# Patient Record
Sex: MALE | Race: OTHER | HISPANIC OR LATINO | URBAN - METROPOLITAN AREA
[De-identification: names, ages, dates, MRNs, and addresses within clinical notes are randomized per-mention and may not be internally consistent; named-entity substitution may affect disease eponyms.]

---

## 2024-01-06 ENCOUNTER — EMERGENCY (EMERGENCY)
Facility: HOSPITAL | Age: 49
LOS: 0 days | Discharge: HOME | End: 2024-01-06
Attending: STUDENT IN AN ORGANIZED HEALTH CARE EDUCATION/TRAINING PROGRAM
Payer: OTHER MISCELLANEOUS

## 2024-01-06 VITALS
TEMPERATURE: 98 F | HEART RATE: 81 BPM | WEIGHT: 165.35 LBS | SYSTOLIC BLOOD PRESSURE: 176 MMHG | RESPIRATION RATE: 19 BRPM | OXYGEN SATURATION: 98 % | DIASTOLIC BLOOD PRESSURE: 77 MMHG | HEIGHT: 68 IN
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
## 2024-01-06 DIAGNOSIS — S39.012A STRAIN OF MUSCLE, FASCIA AND TENDON OF LOWER BACK, INITIAL ENCOUNTER: ICD-10-CM
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
## 2024-01-06 DIAGNOSIS — M54.50 LOW BACK PAIN, UNSPECIFIED: ICD-10-CM
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
## 2024-01-06 PROCEDURE — 99284 EMERGENCY DEPT VISIT MOD MDM: CPT

## 2024-01-06 PROCEDURE — 72100 X-RAY EXAM L-S SPINE 2/3 VWS: CPT | Mod: 26

## 2024-01-06 RX ORDER — KETOROLAC TROMETHAMINE 30 MG/ML
60 SYRINGE (ML) INJECTION ONCE
Refills: 0 | Status: DISCONTINUED | OUTPATIENT
Start: 2024-01-06 | End: 2024-01-06

## 2024-01-06 RX ORDER — CYCLOBENZAPRINE HYDROCHLORIDE 10 MG/1
10 TABLET, FILM COATED ORAL ONCE
Refills: 0 | Status: COMPLETED | OUTPATIENT
Start: 2024-01-06 | End: 2024-01-06

## 2024-01-06 RX ORDER — LIDOCAINE 4 G/100G
1 CREAM TOPICAL ONCE
Refills: 0 | Status: COMPLETED | OUTPATIENT
Start: 2024-01-06 | End: 2024-01-06

## 2024-01-06 RX ORDER — TIZANIDINE 4 MG/1
2 TABLET ORAL
Qty: 30 | Refills: 0
Start: 2024-01-06 | End: 2024-01-10

## 2024-01-06 RX ADMIN — Medication 60 MILLIGRAM(S): at 13:52

## 2024-01-06 RX ADMIN — LIDOCAINE 1 PATCH: 4 CREAM TOPICAL at 14:17

## 2024-01-06 RX ADMIN — CYCLOBENZAPRINE HYDROCHLORIDE 10 MILLIGRAM(S): 10 TABLET, FILM COATED ORAL at 13:51

## 2024-01-06 NOTE — ED PROVIDER NOTE - OBJECTIVE STATEMENT
48-year-old male with no significant past medical history presenting to the ED with complaints of left lower back pain without associate bowel bladder incontinence no saddle esthesia after lifting heavy item at work, patient reports pain started since then.  Reports taking Motrin with minimal relief.  Denies any other complaints.  No associated numbness.

## 2024-01-06 NOTE — ED PROVIDER NOTE - CLINICAL SUMMARY MEDICAL DECISION MAKING FREE TEXT BOX
48-year-old male with no significant past medical history presenting to the ED with complaints of left lower back pain without associate bowel bladder incontinence no saddle esthesia after lifting heavy item at work, patient reports pain started since then.  Reports taking Motrin with minimal relief.  Denies any other complaints.  No associated numbness.    lumbar strain   f/u ortho if persistent pain  conservative management   return precautions

## 2024-01-06 NOTE — ED PROVIDER NOTE - CARE PROVIDER_API CALL
Ajit Macias  Orthopaedic Surgery  [address] [facility] [address], [address]  [place] [address]  Phone: [phone]  Fax: [phone]  Follow Up Time: 7-10 Days   Ajit Macias  Orthopaedic Surgery  [address] [facility] [facility] [address], [address]  [place] [address]  Phone: [phone]  Fax: [phone]  Follow Up Time: 7-10 Days

## 2024-01-06 NOTE — ED ADULT NURSE NOTE - NSFALLUNIVINTERV_ED_ALL_ED
Bed/Stretcher in lowest position, wheels locked, appropriate side rails in place/Call bell, personal items and telephone in reach/Instruct patient to call for assistance before getting out of bed/chair/stretcher/Non-slip footwear applied when patient is off stretcher/[place] to call system/Physically safe environment - no spills, clutter or unnecessary equipment/Purposeful proactive rounding/Room/bathroom lighting operational, light cord in reach Bed/Stretcher in lowest position, wheels locked, appropriate side rails in place/Call bell, personal items and telephone in reach/Instruct patient to call for assistance before getting out of bed/chair/stretcher/Non-slip footwear applied when patient is off stretcher/[place] to call system/Physically safe environment - no spills, clutter or unnecessary equipment/Purposeful proactive rounding/Room/bathroom lighting operational, light cord in reach

## 2024-01-06 NOTE — ED PROVIDER NOTE - PROVIDER TOKENS
PROVIDER:[TOKEN:[[id number]],FOLLOWUP:[7-10 Days]] PROVIDER:[TOKEN:[[id number]],FOLLOWUP:[7-10 Days]]

## 2024-01-06 NOTE — ED PROVIDER NOTE - PHYSICAL EXAMINATION
VITAL SIGNS: I have reviewed nursing notes and confirm.  CONSTITUTIONAL: well-appearing, non-toxic, NAD  SKIN: Warm dry, normal skin turgor  HEAD: NCAT  EXT: Full ROM, no bony tenderness, no pedal edema, no calf tenderness  NEURO: normal motor. normal sensory. . Normal gait.  PSYCH: Cooperative, appropriate.

## 2024-01-06 NOTE — ED PROVIDER NOTE - PATIENT PORTAL LINK FT
You can access the FollowMyHealth Patient Portal offered by [facility] by registering at the following website: [email or web address]. By joining [employer]’s FollowMyHealth portal, you will also be able to view your health information using other applications (apps) compatible with our system. You can access the FollowMyHealth Patient Portal offered by [facility] by registering at the following website: [email or web address]. By joining [employer]’s FollowMyHealth portal, you will also be able to view your health information using other applications (apps) compatible with our system.

## 2024-01-08 ENCOUNTER — EMERGENCY (EMERGENCY)
Facility: HOSPITAL | Age: 49
LOS: 0 days | Discharge: HOME | End: 2024-01-08
Attending: EMERGENCY MEDICINE
Payer: COMMERCIAL

## 2024-01-08 VITALS
DIASTOLIC BLOOD PRESSURE: 83 MMHG | TEMPERATURE: 97 F | WEIGHT: 164.91 LBS | RESPIRATION RATE: 17 BRPM | OXYGEN SATURATION: 98 % | SYSTOLIC BLOOD PRESSURE: 144 MMHG | HEIGHT: 68 IN | HEART RATE: 58 BPM

## 2024-01-08 VITALS
RESPIRATION RATE: 18 BRPM | DIASTOLIC BLOOD PRESSURE: 78 MMHG | HEART RATE: 61 BPM | OXYGEN SATURATION: 97 % | TEMPERATURE: 98 F | SYSTOLIC BLOOD PRESSURE: 137 MMHG
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
## 2024-01-08 DIAGNOSIS — I10 ESSENTIAL (PRIMARY) HYPERTENSION: ICD-10-CM

## 2024-01-08 DIAGNOSIS — M54.50 LOW BACK PAIN, UNSPECIFIED: ICD-10-CM
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
## 2024-01-08 DIAGNOSIS — M47.816 SPONDYLOSIS WITHOUT MYELOPATHY OR RADICULOPATHY, LUMBAR REGION: ICD-10-CM

## 2024-01-08 PROBLEM — Z00.00 ENCOUNTER FOR PREVENTIVE HEALTH EXAMINATION: Status: ACTIVE | Noted: 2024-01-08

## 2024-01-08 PROCEDURE — 72131 CT LUMBAR SPINE W/O DYE: CPT | Mod: 26,MA

## 2024-01-08 PROCEDURE — 99284 EMERGENCY DEPT VISIT MOD MDM: CPT

## 2024-01-08 RX ORDER — OXYCODONE AND ACETAMINOPHEN 5; 325 MG/1; MG/1
2 TABLET ORAL
Qty: 32 | Refills: 0
Start: 2024-01-08 | End: 2024-01-11

## 2024-01-08 RX ORDER — IBUPROFEN 200 MG
1 TABLET ORAL
Qty: 20 | Refills: 0
Start: 2024-01-08 | End: 2024-01-12

## 2024-01-08 RX ORDER — IBUPROFEN 200 MG
600 TABLET ORAL ONCE
Refills: 0 | Status: COMPLETED | OUTPATIENT
Start: 2024-01-08 | End: 2024-01-08

## 2024-01-08 RX ADMIN — Medication 600 MILLIGRAM(S): at 04:50

## 2024-01-08 NOTE — ED PROVIDER NOTE - PHYSICAL EXAMINATION
Vitals: HTN at 144/83  Gen: AAOx3, NAD, sitting uncomfortably in stretcher, holding L lower back   Head: ncat, perrla, eomi b/l  Neck: supple, no lymphadenopathy, no midline deviation  Heart: rrr, no m/r/g  Lungs: CTA b/l, no rales/ronchi/wheezes  Abd: soft, nontender, non-distended, no rebound or guarding  Ext: no clubbing/cyanosis/edema  Neuro: sensation and muscle strength intact b/l, steady gait  musculo: + L paraspinal muscle hypertonicity, no midline tenderness or stepoff

## 2024-01-08 NOTE — ED PROVIDER NOTE - CARE PROVIDER_API CALL
Gardenia Quinonez  Orthopaedic Surgery  [address] [facility], [address] [address]  Phone: [phone]  Fax: [phone]  Follow Up Time: Urgent   Gardenia Quinonez  Orthopaedic Surgery  [address] [facility], [address] [address]  Phone: [phone]  Fax: [phone]  Follow Up Time: Urgent

## 2024-01-08 NOTE — ED PROVIDER NOTE - CLINICAL SUMMARY MEDICAL DECISION MAKING FREE TEXT BOX
47 yo M with L lower back pain, concerning for fracture, radiculopathy, doubt renal stone  -CT lumbar, motrin/percocet  -f/u results, reeval

## 2024-01-08 NOTE — ED PROVIDER NOTE - PROVIDER TOKENS
PROVIDER:[TOKEN:[[id number]],FOLLOWUP:[Urgent]] PROVIDER:[TOKEN:[[id number]],FOLLOWUP:[Urgent]]

## 2024-01-08 NOTE — ED ADULT NURSE NOTE - NSFALLRISKFACTORS_ED_ALL_ED
Other (Free Text): \[facility]\"" NER Exc 8/2020 Other (Free Text): [facility] [place] NER Exc 8/2020 No indicators present

## 2024-01-08 NOTE — ED PROVIDER NOTE - CARE PLAN
1 Principal Discharge DX:	Pain in lower back   Principal Discharge DX:	Pain in lower back  Secondary Diagnosis:	Spondylosis of lumbar joint

## 2024-01-08 NOTE — ED PROVIDER NOTE - PROGRESS NOTE DETAILS
Results reported to patient--lumbar spondylosis   Pt. reports feeling better after meds  pt. agrees to f/u with primary care outpt., spinal referral given for urgent f/u   pt. understands to return to ED if symptoms worsen; will d/c with meds for pain, rest encouraged

## 2024-01-08 NOTE — ED PROVIDER NOTE - PATIENT PORTAL LINK FT
You can access the FollowMyHealth Patient Portal offered by [facility] by registering at the following website: [email or web address]. By joining [employer]’s FollowMyHealth portal, you will also be able to view your health information using other applications (apps) compatible with our system. You can access the FollowMyHealth Patient Portal offered by [facility] by registering at the following website: [email or web address]. By joining [employer]’s FollowMyHealth portal, you will also be able to view your health information using other applications (apps) compatible with our system.

## 2024-01-08 NOTE — ED PROVIDER NOTE - OBJECTIVE STATEMENT
49 yo M with L lower back pain after heavy lifting 3 days ago.  Pt. seen yesterday here and given muscle relaxer which helped initially but pain worsened.  No imaging performed thus far.  No history of back pain.    ROS: negative for fever, cough, headache, chest pain, shortness of breath, abd pain, nausea, vomiting, diarrhea, rash, paresthesia, and weakness--all other systems reviewed are negative.   PMH: Denies; Meds: Denies; SH: Denies smoking/drinking/drug use

## 2024-01-09 ENCOUNTER — APPOINTMENT (OUTPATIENT)
Dept: ORTHOPEDIC SURGERY | Facility: CLINIC | Age: 49
End: 2024-01-09
Payer: OTHER MISCELLANEOUS

## 2024-01-09 VITALS — BODY MASS INDEX: 29.82 KG/M2 | HEIGHT: 67 IN | WEIGHT: 190 LBS

## 2024-01-09 DIAGNOSIS — S33.5XXA SPRAIN OF LIGAMENTS OF LUMBAR SPINE, INITIAL ENCOUNTER: ICD-10-CM

## 2024-01-09 PROCEDURE — 99204 OFFICE O/P NEW MOD 45 MIN: CPT

## 2024-01-09 RX ORDER — METHYLPREDNISOLONE 4 MG/1
4 TABLET ORAL
Qty: 1 | Refills: 0 | Status: ACTIVE | COMMUNITY
Start: 2024-01-09 | End: 1900-01-01

## 2024-01-09 RX ORDER — METHOCARBAMOL 750 MG/1
750 TABLET, FILM COATED ORAL
Qty: 30 | Refills: 2 | Status: ACTIVE | COMMUNITY
Start: 2024-01-09 | End: 1900-01-01

## 2024-01-23 ENCOUNTER — APPOINTMENT (OUTPATIENT)
Dept: ORTHOPEDIC SURGERY | Facility: CLINIC | Age: 49
End: 2024-01-23
Payer: OTHER MISCELLANEOUS

## 2024-01-23 VITALS — HEIGHT: 67 IN | BODY MASS INDEX: 29.82 KG/M2 | WEIGHT: 190 LBS

## 2024-01-23 PROCEDURE — 99213 OFFICE O/P EST LOW 20 MIN: CPT

## 2024-01-23 NOTE — PHYSICAL EXAM
[Flexion] : flexion [Bending to left] : bending to left [Bending to right] : bending to right [] : achilles and patella reflexes are symmetrical [FreeTextEntry8] : improving

## 2024-01-23 NOTE — WORK
[Sprain/Strain] : sprain/strain [Was the competent medical cause of the injury] : was the competent medical cause of the injury [Are consistent with the injury] : are consistent with the injury [Consistent with my objective findings] : consistent with my objective findings [Total (100%)] : total (100%) [Reveals pre-existing condition(s) that may affect treatment/prognosis] : reveals pre-existing condition(s) that may affect treatment/prognosis [N/A] : : Not Applicable [Patient] : patient [No] : No [No Rx restrictions] : No Rx restrictions. [I provided the services listed above] :  I provided the services listed above. [FreeTextEntry1] : fair [FreeTextEntry2] : ddd [Medium Work:] : Medium Work: Exerting 20 to 50 pounds of force occasionally, and/or 10 to 25 pounds of force frequently, and/or greater than negligible up to 10 pounds of force constantly to move objects. Physical demand requirements are in excess of those for Light Work [Less than Sedentary Work:] :  Less than Sedentary Work: Unable to meet the requirement of Sedentary Work.

## 2024-01-23 NOTE — HISTORY OF PRESENT ILLNESS
[Lower back] : lower back [Work related] : work related [9] : 9 [5] : 5 [Dull/Aching] : dull/aching [Sharp] : sharp [Constant] : constant [Nothing helps with pain getting better] : Nothing helps with pain getting better [de-identified] : Lifted up a water bucket at work, felt sharp low back pain. Has been to the ER twice due to severe pain, inability to move. No pain down legs. no bowel or bladder changes. No prior low back issues [] : no [FreeTextEntry5] : Pt went to lift a mop bucket when he felt a sharp pain in his lower back. Pt went to ED X-rays and CT were taken and Pt was Dx with a lumbar sprain.

## 2024-02-20 ENCOUNTER — APPOINTMENT (OUTPATIENT)
Dept: ORTHOPEDIC SURGERY | Facility: CLINIC | Age: 49
End: 2024-02-20
Payer: OTHER MISCELLANEOUS

## 2024-02-20 VITALS — BODY MASS INDEX: 29.82 KG/M2 | HEIGHT: 67 IN | WEIGHT: 190 LBS

## 2024-02-20 PROCEDURE — 99213 OFFICE O/P EST LOW 20 MIN: CPT

## 2024-02-20 NOTE — WORK
[Sprain/Strain] : sprain/strain [Was the competent medical cause of the injury] : was the competent medical cause of the injury [Are consistent with the injury] : are consistent with the injury [Consistent with my objective findings] : consistent with my objective findings [Mild Partial] : mild partial [Reveals pre-existing condition(s) that may affect treatment/prognosis] : reveals pre-existing condition(s) that may affect treatment/prognosis [Can return to work without limitations on ______] : can return to work without limitations on [unfilled] [N/A] : : Not Applicable [Patient] : patient [No] : No [No Rx restrictions] : No Rx restrictions. [I provided the services listed above] :  I provided the services listed above. [FreeTextEntry1] : fair [FreeTextEntry2] : ddd [Medium Work:] : Medium Work: Exerting 20 to 50 pounds of force occasionally, and/or 10 to 25 pounds of force frequently, and/or greater than negligible up to 10 pounds of force constantly to move objects. Physical demand requirements are in excess of those for Light Work [Less than Sedentary Work:] :  Less than Sedentary Work: Unable to meet the requirement of Sedentary Work.

## 2024-02-20 NOTE — HISTORY OF PRESENT ILLNESS
[Lower back] : lower back [Work related] : work related [9] : 9 [5] : 5 [Dull/Aching] : dull/aching [Sharp] : sharp [Constant] : constant [Nothing helps with pain getting better] : Nothing helps with pain getting better [de-identified] : Lifted up a water bucket at work, felt sharp low back pain. Has been to the ER twice due to severe pain, inability to move. No pain down legs. no bowel or bladder changes. No prior low back issues [] : no [FreeTextEntry5] : Pt went to lift a mop bucket when he felt a sharp pain in his lower back. Pt went to ED X-rays and CT were taken and Pt was Dx with a lumbar sprain.

## 2024-02-20 NOTE — REASON FOR VISIT
[FreeTextEntry2] : 02/20/2024 Doing better, back to regular work, finished PT 01/23/2024 Doing a bit better, back just a bit stiff

## 2024-04-02 ENCOUNTER — APPOINTMENT (OUTPATIENT)
Dept: MRI IMAGING | Facility: CLINIC | Age: 49
End: 2024-04-02
Payer: OTHER MISCELLANEOUS

## 2024-04-02 ENCOUNTER — APPOINTMENT (OUTPATIENT)
Dept: ORTHOPEDIC SURGERY | Facility: CLINIC | Age: 49
End: 2024-04-02
Payer: OTHER MISCELLANEOUS

## 2024-04-02 VITALS — WEIGHT: 190 LBS | BODY MASS INDEX: 29.82 KG/M2 | HEIGHT: 67 IN

## 2024-04-02 PROCEDURE — 72148 MRI LUMBAR SPINE W/O DYE: CPT

## 2024-04-02 PROCEDURE — 99213 OFFICE O/P EST LOW 20 MIN: CPT

## 2024-04-02 NOTE — HISTORY OF PRESENT ILLNESS
[Lower back] : lower back [Work related] : work related [9] : 9 [Dull/Aching] : dull/aching [5] : 5 [Sharp] : sharp [Constant] : constant [Nothing helps with pain getting better] : Nothing helps with pain getting better [de-identified] : Lifted up a water bucket at work, felt sharp low back pain. Has been to the ER twice due to severe pain, inability to move. No pain down legs. no bowel or bladder changes. No prior low back issues [] : no [FreeTextEntry5] : Pt went to lift a mop bucket when he felt a sharp pain in his lower back. Pt went to ED X-rays and CT were taken and Pt was Dx with a lumbar sprain.

## 2024-04-02 NOTE — REASON FOR VISIT
[FreeTextEntry2] : 04/02/2024 Still with back soreness, doing HEP, using Ibuprofen as needed 02/20/2024 Doing better, back to regular work, finished PT 01/23/2024 Doing a bit better, back just a bit stiff

## 2024-04-09 ENCOUNTER — APPOINTMENT (OUTPATIENT)
Dept: ORTHOPEDIC SURGERY | Facility: CLINIC | Age: 49
End: 2024-04-09
Payer: OTHER MISCELLANEOUS

## 2024-04-09 DIAGNOSIS — S33.9XXD SPRAIN OF UNSPECIFIED PARTS OF LUMBAR SPINE AND PELVIS, SUBSEQUENT ENCOUNTER: ICD-10-CM

## 2024-04-09 DIAGNOSIS — M51.26 OTHER INTERVERTEBRAL DISC DISPLACEMENT, LUMBAR REGION: ICD-10-CM

## 2024-04-09 PROCEDURE — 99213 OFFICE O/P EST LOW 20 MIN: CPT

## 2024-04-09 NOTE — WORK
[Sprain/Strain] : sprain/strain [Was the competent medical cause of the injury] : was the competent medical cause of the injury [Are consistent with the injury] : are consistent with the injury [Consistent with my objective findings] : consistent with my objective findings [Mild Partial] : mild partial [Reveals pre-existing condition(s) that may affect treatment/prognosis] : reveals pre-existing condition(s) that may affect treatment/prognosis [Can return to work without limitations on ______] : can return to work without limitations on [unfilled] [N/A] : : Not Applicable [Patient] : patient [No] : No [No Rx restrictions] : No Rx restrictions. [I provided the services listed above] :  I provided the services listed above. [Medium Work:] : Medium Work: Exerting 20 to 50 pounds of force occasionally, and/or 10 to 25 pounds of force frequently, and/or greater than negligible up to 10 pounds of force constantly to move objects. Physical demand requirements are in excess of those for Light Work [Less than Sedentary Work:] :  Less than Sedentary Work: Unable to meet the requirement of Sedentary Work. [FreeTextEntry1] : fair [FreeTextEntry2] : ddd

## 2024-04-09 NOTE — HISTORY OF PRESENT ILLNESS
[Lower back] : lower back [Work related] : work related [9] : 9 [5] : 5 [Dull/Aching] : dull/aching [Sharp] : sharp [Constant] : constant [Nothing helps with pain getting better] : Nothing helps with pain getting better [de-identified] : Lifted up a water bucket at work, felt sharp low back pain. Has been to the ER twice due to severe pain, inability to move. No pain down legs. no bowel or bladder changes. No prior low back issues [] : no [FreeTextEntry5] : Pt went to lift a mop bucket when he felt a sharp pain in his lower back. Pt went to ED X-rays and CT were taken and Pt was Dx with a lumbar sprain.

## 2024-04-09 NOTE — REASON FOR VISIT
[FreeTextEntry2] : 04/09/2024 Had MRI: HNP left L1/2 impinging on dural sac 04/02/2024 Still with back soreness, doing HEP, using Ibuprofen as needed 02/20/2024 Doing better, back to regular work, finished PT 01/23/2024 Doing a bit better, back just a bit stiff

## 2024-07-09 ENCOUNTER — APPOINTMENT (OUTPATIENT)
Dept: ORTHOPEDIC SURGERY | Facility: CLINIC | Age: 49
End: 2024-07-09
Payer: OTHER MISCELLANEOUS

## 2024-07-09 VITALS — HEIGHT: 67 IN | BODY MASS INDEX: 29.82 KG/M2 | WEIGHT: 190 LBS

## 2024-07-09 DIAGNOSIS — S33.9XXD SPRAIN OF UNSPECIFIED PARTS OF LUMBAR SPINE AND PELVIS, SUBSEQUENT ENCOUNTER: ICD-10-CM

## 2024-07-09 DIAGNOSIS — M51.26 OTHER INTERVERTEBRAL DISC DISPLACEMENT, LUMBAR REGION: ICD-10-CM

## 2024-07-09 PROCEDURE — 99213 OFFICE O/P EST LOW 20 MIN: CPT

## 2024-08-19 ENCOUNTER — TRANSCRIPTION ENCOUNTER (OUTPATIENT)
Age: 49
End: 2024-08-19

## 2024-08-19 ENCOUNTER — APPOINTMENT (OUTPATIENT)
Dept: ORTHOPEDIC SURGERY | Facility: CLINIC | Age: 49
End: 2024-08-19
Payer: OTHER MISCELLANEOUS

## 2024-08-19 DIAGNOSIS — M51.26 OTHER INTERVERTEBRAL DISC DISPLACEMENT, LUMBAR REGION: ICD-10-CM

## 2024-08-19 DIAGNOSIS — S33.9XXD SPRAIN OF UNSPECIFIED PARTS OF LUMBAR SPINE AND PELVIS, SUBSEQUENT ENCOUNTER: ICD-10-CM

## 2024-08-19 PROCEDURE — 99213 OFFICE O/P EST LOW 20 MIN: CPT

## 2024-08-19 NOTE — WORK
[Sprain/Strain] : sprain/strain [Was the competent medical cause of the injury] : was the competent medical cause of the injury [Are consistent with the injury] : are consistent with the injury [Consistent with my objective findings] : consistent with my objective findings [Mild Partial] : mild partial [Reveals pre-existing condition(s) that may affect treatment/prognosis] : reveals pre-existing condition(s) that may affect treatment/prognosis [Can return to work without limitations on ______] : can return to work without limitations on [unfilled] [N/A] : : Not Applicable [Patient] : patient [No] : No [No Rx restrictions] : No Rx restrictions. [I provided the services listed above] :  I provided the services listed above. [FreeTextEntry1] : fair [Medium Work:] : Medium Work: Exerting 20 to 50 pounds of force occasionally, and/or 10 to 25 pounds of force frequently, and/or greater than negligible up to 10 pounds of force constantly to move objects. Physical demand requirements are in excess of those for Light Work [FreeTextEntry2] : ddd [Less than Sedentary Work:] :  Less than Sedentary Work: Unable to meet the requirement of Sedentary Work.

## 2024-08-19 NOTE — REASON FOR VISIT
[FreeTextEntry2] : 08/19/2024 Still with pain across back. Doing HEP 07/09/2024: patient feels that pain is worse. Pain has moved from left side to across the whole lower back. Finds that home exercises are no longer working 04/09/2024 Had MRI: HNP left L1/2 impinging on dural sac 04/02/2024 Still with back soreness, doing HEP, using Ibuprofen as needed 02/20/2024 Doing better, back to regular work, finished PT 01/23/2024 Doing a bit better, back just a bit stiff

## 2024-08-19 NOTE — HISTORY OF PRESENT ILLNESS
[Lower back] : lower back [Work related] : work related [9] : 9 [5] : 5 [Dull/Aching] : dull/aching [Radiating] : radiating [Sharp] : sharp [Constant] : constant [Nothing helps with pain getting better] : Nothing helps with pain getting better [Full time] : Work status: full time [de-identified] : Lifted up a water bucket at work, felt sharp low back pain. Has been to the ER twice due to severe pain, inability to move. No pain down legs. no bowel or bladder changes. No prior low back issues [] : no [FreeTextEntry7] : across back

## 2024-09-30 ENCOUNTER — APPOINTMENT (OUTPATIENT)
Dept: ORTHOPEDIC SURGERY | Facility: CLINIC | Age: 49
End: 2024-09-30
Payer: OTHER MISCELLANEOUS

## 2024-09-30 DIAGNOSIS — M51.26 OTHER INTERVERTEBRAL DISC DISPLACEMENT, LUMBAR REGION: ICD-10-CM

## 2024-09-30 DIAGNOSIS — S33.9XXD SPRAIN OF UNSPECIFIED PARTS OF LUMBAR SPINE AND PELVIS, SUBSEQUENT ENCOUNTER: ICD-10-CM

## 2024-09-30 PROCEDURE — 99213 OFFICE O/P EST LOW 20 MIN: CPT

## 2024-09-30 NOTE — HISTORY OF PRESENT ILLNESS
[Lower back] : lower back [Work related] : work related [9] : 9 [5] : 5 [Dull/Aching] : dull/aching [Radiating] : radiating [Sharp] : sharp [Constant] : constant [Nothing helps with pain getting better] : Nothing helps with pain getting better [Full time] : Work status: full time [de-identified] : Lifted up a water bucket at work, felt sharp low back pain. Has been to the ER twice due to severe pain, inability to move. No pain down legs. no bowel or bladder changes. No prior low back issues [] : no [FreeTextEntry7] : across back

## 2024-09-30 NOTE — REASON FOR VISIT
[FreeTextEntry2] : 09/30/2024 Had a flareup of back pain to right thigh/lateral knee 2 weeks ago, seemed to resolve with Ibuprofen 08/19/2024 Still with pain across back. Doing HEP 07/09/2024: patient feels that pain is worse. Pain has moved from left side to across the whole lower back. Finds that home exercises are no longer working 04/09/2024 Had MRI: HNP left L1/2 impinging on dural sac 04/02/2024 Still with back soreness, doing HEP, using Ibuprofen as needed 02/20/2024 Doing better, back to regular work, finished PT 01/23/2024 Doing a bit better, back just a bit stiff